# Patient Record
Sex: FEMALE | URBAN - METROPOLITAN AREA
[De-identification: names, ages, dates, MRNs, and addresses within clinical notes are randomized per-mention and may not be internally consistent; named-entity substitution may affect disease eponyms.]

---

## 2022-06-24 ENCOUNTER — APPOINTMENT (RX ONLY)
Dept: URBAN - METROPOLITAN AREA CLINIC 23 | Facility: CLINIC | Age: 62
Setting detail: DERMATOLOGY
End: 2022-06-24

## 2022-06-24 DIAGNOSIS — L98.8 OTHER SPECIFIED DISORDERS OF THE SKIN AND SUBCUTANEOUS TISSUE: ICD-10-CM

## 2022-06-24 PROCEDURE — ? COSMETIC CONSULTATION: BOTULINUM TOXIN

## 2022-06-24 PROCEDURE — ? BOTOX

## 2022-06-24 NOTE — PROCEDURE: BOTOX
R Brow Units: 0
Additional Area 1 Location: brow lift
Price (Use Numbers Only, No Special Characters Or $): 547
Show Mentalis Units: No
Show Additional Area 2: Yes
Glabellar Complex Units: 10
Reconstitution Date (Optional): 05/24/22 & 6/24/22
Additional Area 1 Units: 2
Consent: Written consent obtained. Risks include but not limited to lid/brow ptosis, bruising, swelling, diplopia, temporary effect, incomplete chemical denervation.
Detail Level: Detailed
Periorbital Skin Units: 20
Additional Area 2 Location: lip flip
Post-Care Instructions: Patient instructed to not lie down for 4 hours and limit physical activity for 24 hours.
Lot #: [id number]
Additional Area 2 Units: 4
Expiration Date (Month Year): 05/2024 & 10/2023